# Patient Record
Sex: MALE | Race: WHITE | NOT HISPANIC OR LATINO | Employment: FULL TIME
[De-identification: names, ages, dates, MRNs, and addresses within clinical notes are randomized per-mention and may not be internally consistent; named-entity substitution may affect disease eponyms.]

---

## 2019-09-28 ENCOUNTER — HEALTH MAINTENANCE LETTER (OUTPATIENT)
Age: 39
End: 2019-09-28

## 2020-06-18 ENCOUNTER — APPOINTMENT (OUTPATIENT)
Age: 40
Setting detail: DERMATOLOGY
End: 2020-06-22

## 2020-06-18 VITALS — WEIGHT: 180 LBS | HEIGHT: 70 IN | RESPIRATION RATE: 16 BRPM

## 2020-06-18 DIAGNOSIS — B36.0 PITYRIASIS VERSICOLOR: ICD-10-CM

## 2020-06-18 DIAGNOSIS — D22 MELANOCYTIC NEVI: ICD-10-CM

## 2020-06-18 DIAGNOSIS — L81.4 OTHER MELANIN HYPERPIGMENTATION: ICD-10-CM

## 2020-06-18 PROBLEM — D22.5 MELANOCYTIC NEVI OF TRUNK: Status: ACTIVE | Noted: 2020-06-18

## 2020-06-18 PROCEDURE — 99214 OFFICE O/P EST MOD 30 MIN: CPT

## 2020-06-18 PROCEDURE — OTHER PRESCRIPTION SAMPLES PROVIDED: OTHER

## 2020-06-18 PROCEDURE — OTHER SUNSCREEN RECOMMENDATIONS: OTHER

## 2020-06-18 PROCEDURE — OTHER PRESCRIPTION: OTHER

## 2020-06-18 PROCEDURE — OTHER COUNSELING: OTHER

## 2020-06-18 RX ORDER — KETOCONAZOLE 20 MG/ML
SHAMPOO TOPICAL TIW
Qty: 1 | Refills: 10 | Status: ERX | COMMUNITY
Start: 2020-06-18

## 2020-06-18 ASSESSMENT — LOCATION DETAILED DESCRIPTION DERM
LOCATION DETAILED: RIGHT POSTERIOR SHOULDER
LOCATION DETAILED: RIGHT CENTRAL MALAR CHEEK
LOCATION DETAILED: PERIUMBILICAL SKIN
LOCATION DETAILED: LEFT SUPERIOR LATERAL UPPER BACK
LOCATION DETAILED: LEFT SUPERIOR MEDIAL MIDBACK
LOCATION DETAILED: LEFT POSTERIOR SHOULDER
LOCATION DETAILED: STERNUM
LOCATION DETAILED: RIGHT SUPERIOR MEDIAL UPPER BACK

## 2020-06-18 ASSESSMENT — LOCATION SIMPLE DESCRIPTION DERM
LOCATION SIMPLE: RIGHT CHEEK
LOCATION SIMPLE: CHEST
LOCATION SIMPLE: RIGHT SHOULDER
LOCATION SIMPLE: LEFT SHOULDER
LOCATION SIMPLE: RIGHT UPPER BACK
LOCATION SIMPLE: LEFT LOWER BACK
LOCATION SIMPLE: ABDOMEN
LOCATION SIMPLE: LEFT UPPER BACK

## 2020-06-18 ASSESSMENT — LOCATION ZONE DERM
LOCATION ZONE: TRUNK
LOCATION ZONE: FACE
LOCATION ZONE: ARM

## 2020-06-18 NOTE — HPI: FULL BODY SKIN EXAMINATION
How Severe Are Your Spot(S)?: moderate
What Type Of Note Output Would You Prefer (Optional)?: Bullet Format
What Is The Reason For Today's Visit?: Skin Lesion
What Is The Reason For Today's Visit? (Being Monitored For X): the development of new lesions

## 2021-01-10 ENCOUNTER — HEALTH MAINTENANCE LETTER (OUTPATIENT)
Age: 41
End: 2021-01-10

## 2021-10-23 ENCOUNTER — HEALTH MAINTENANCE LETTER (OUTPATIENT)
Age: 41
End: 2021-10-23

## 2022-02-12 ENCOUNTER — HEALTH MAINTENANCE LETTER (OUTPATIENT)
Age: 42
End: 2022-02-12

## 2022-10-09 ENCOUNTER — HEALTH MAINTENANCE LETTER (OUTPATIENT)
Age: 42
End: 2022-10-09

## 2023-05-02 ENCOUNTER — THERAPY VISIT (OUTPATIENT)
Dept: PHYSICAL THERAPY | Facility: CLINIC | Age: 43
End: 2023-05-02
Payer: COMMERCIAL

## 2023-05-02 DIAGNOSIS — R27.8 MUSCULAR INCOORDINATION: ICD-10-CM

## 2023-05-02 DIAGNOSIS — R10.2 PELVIC PAIN IN MALE: ICD-10-CM

## 2023-05-02 DIAGNOSIS — M62.89 HIGH-TONE PELVIC FLOOR DYSFUNCTION: ICD-10-CM

## 2023-05-02 PROCEDURE — 97535 SELF CARE MNGMENT TRAINING: CPT | Mod: GP | Performed by: PHYSICAL THERAPIST

## 2023-05-02 PROCEDURE — 97161 PT EVAL LOW COMPLEX 20 MIN: CPT | Mod: GP | Performed by: PHYSICAL THERAPIST

## 2023-05-02 PROCEDURE — 97110 THERAPEUTIC EXERCISES: CPT | Mod: GP | Performed by: PHYSICAL THERAPIST

## 2023-05-02 NOTE — PROGRESS NOTES
Physical Therapy Initial Evaluation  Subjective:  The history is provided by the patient. No  was used.   Therapist Generated HPI Evaluation  Problem details: Onset of pelvic floor pain about 8 years ago. Just woke up with the pain when he had to go to the bathroom. Went to MD who gave him antibiotics which didn't help. Then put in a camera and that was all normal. When he flared, would have to go to the bathroom many times/night. Upwards of 5-6x/night but that's not all the time. Sometimes will only go 1x. Through work was sent to [facility] for a physical and did some testing and their best conclusion was pelvic floor. Pain overall will ebb and flow. Sometimes will have to strain to start urination and then relax. .                                    SUBJECTIVE:    Urination:  Do you leak on the way to the bathroom or with a strong urge to void? No   Do you leak with cough,sneeze, jumping, running?No   Any other activities that cause leaking? After urination. Will go to the bathroom and then have some leakage.    Do you have triggers that make you feel you can't wait to go to the bathroom? No what are they.  Type of pad and number used per day? None  When you leak what is the amount? Small  Will get increasing pain with the need to go to the bathroom. Is able to control it when he has access to the bathroom. Notes the pain is more in his genitals, shaft of the penis and the L lower abdominal area. Feels like it's a strong pressure pain. Once he urinates, the pain goes away.  How long can you delay the need to urinate? Doesn't delay due to the pain.  Tends to go to the bathroom 15 min if he's drinking a lot of water. If he doesn't drink a lot, he has a hard time going to the bathroom. When he goes frequently, he gets a decent amount out.   How many times do you get up to urinate at night? 1-2 up to 5-6   Can you stop the flow of urine when on the toilet? Yes  Is the volume of urine passed usually:  medium. (8sec rule= 250ml with average bladder storing 400-600ml)  Do you feel empty when you are done? Doesn't always feel when he's done. Doesn't feel like he has good feeling in his bladder. Does feel empty.   Do you strain to pass urine? Yes, if it's a JIC pee.  Do you have a slow or hesitant urinary stream? Yes  Do you have difficulty initiating the urine stream? Yes  Is urination painful? No  How many bladder infections have you had in last 12 months?0  Fluid intake(one glass is 8oz or one cup) 60-80 oz glasses/day, 0 caffinated glasses/day  0 alcohol glasses/day.    Bowel habits:  Frequency of bowel movements? 7 times a week. Does feel like his bowels are constrained and that his stools are a little skinnier than before but no problem getting them out.  Consistancy of stool? soft formed, [place] Stool Scale 4  Do you ignore the urge to defecate? No  Do you strain to pass stool? No    Aggrevating factors:  Is loss of stool associated with an activity (lifting, coughing, running) or a food)  No  Are there any foods that increase or decrease your symptoms?  No  Do you have any food allergies?  No      Sensation:   Can you tell if there is solid, liquid, or gas in the rectum?  Yes  Do you feel the urge to move your bowels?  Yes  Is the urge very strong and difficult to control? NA Or weak/absent? NA  Do you feel the rectum is empty with you finish a bowel movement?   Yes    Do you have abdominal pain?  No  Describe the quality of the pain: NA  What makes your abdominal pain worse? NA  What makes your abdominal pain better? NA  Do you ever have pain that wakes you at night? NA    Do you have rectal pain, pressure, or burning?  No     Pelvic Pain:  Are you sexually active?Yes  Orgasms will feel pinched. Instead of semen just coming out, it feels like there's something blocking it and instead of it all coming out at once, it's coming out little by little.   Over the last year, felt like his erections weren't as  strong but has started to come back a little bit. Denies pain with orgasm or erection but if he's on top and pressing down will feel pain in his shaft. Sometimes post orgasm, will feel pain in the shaft as well which lasts only a minute or so.  Have you ever been worried for your physical safety? No  Do you have any depression, anxiety, panic attacks, excessive stress?  No but tends to be kind of type A.  Any abdominal or pelvic surgeries? No  Are you having any regular exercise? Yes, works out 4-5x/wk lifts 3-4x/week and runs 1 time during the week.   Have you practiced the PF(kegel) exercises for 4 or more weeks?NA  Thyroid checked?No(related to hair loss, flu-like symptoms, wt gain/loss, fatigue, menopause)  Changed diet lately?Couple years ago started eating healthier.    OBSERVATION  Lumbar Posture: Negative  Pelvic symmetry: Negative      ROM  Single leg standing unilateral hip flexion PSIS:Negative  Standing forward flexion PSIS:Negative  Passive Hip ROM:Positive for tightness at end ranges  JAILYN:Positive for tightness at end ranges    JAILYN with OP:Positive for tightness at end ranges      MUSCLE PERFORMANCE  Active SLR:Positive for poor load transfer.   Baseline PF tone:hyper with palpation externally zoë through urogenital triangle. Internal rectal muscle palpation revealed significant tension throughout L>R zoë through puborectalis, OI and into insertion into posterior pubic bone. Pt noted re-creation of pain and pressure with palpation of puborectalis on the L.  PF Tone with cough: hyper  Valsalva: minimal movement noted without opening.  PF Response quality: sluggish and slow return to baseline  PF Power: Center: 1 likely due to tension. Poor ROM noted with contraction as well as relaxation and bulge. Stronger on right/left symmetrical.  Endurance: Maximum contraction in seconds: NT due to tone  # of endurance contractions before fatigue: NT due to tone  Quick contraction repetitions prior to fatigue:  NT due to tone.  Specificity/accessory muscles: overuses gluts, adductors, Synergy with abdominals: Not Tested.        PALPATION: Pain: levator ani, obturator internis and coccygeous                        Objective:  System                                 Pelvic Dysfunction Evaluation:        Flexibility:    Tightness present at:Adductors; Iliopsoas; Hamstrings; Piriformis and Gluteals    Abdominal Wall:  Abdominal wall pelvic: Tension noted throughout lower abdominal area. Poor abdominal expansion with breathing. Holds seld in protective posturing.    Trigger Points:  Transverse abdominals    Pelvic Clock Exam:    Ischiocavernosis pain:  +  Bulbocavernosis pain:  +  Transverse Perineal:  +  Levator ANI:  +  Perineal Body:  +/-      External Assessment:  External assessment pelvic: Poor ability to bear down. no pelvic floor opening noted.  Skin Condition:  Normal          Muscle Contraction/Perineal Mobility:  Slight lift, no urogential triangle descent  Internal Assessment:  Internal assessment pelvic: Increased tone noted in EAS with minimal change with squeeze. With cue to bear down, paradoxical contraction noted with poor lengthening through levator ani.    Contraction/Grade:  Fllicker muscles stretched (1)                               General     ROS    Assessment/Plan:    Patient is a 42 year old male with pelvic complaints.    Patient has the following significant findings with corresponding treatment plan.                Diagnosis 1:  High tone pelvic floor dysfunction with resulting in pelvic pain  Pain -  manual therapy, self management, education, dry needling and home program  Decreased ROM/flexibility - manual therapy, therapeutic exercise, therapeutic activity and home program  Decreased strength - therapeutic exercise, therapeutic activities and home program  Impaired muscle performance - biofeedback, neuro re-education and home program  Decreased function - therapeutic activities and home  program    Therapy Evaluation Codes:   1) History comprised of:   Personal factors that impact the plan of care:      None.    Comorbidity factors that impact the plan of care are:      None.     Medications impacting care: None.  2) Examination of Body Systems comprised of:   Body structures and functions that impact the plan of care:      Pelvis.   Activity limitations that impact the plan of care are:      Working, Frequency, [place] and Urgency.  3) Clinical presentation characteristics are:   Stable/Uncomplicated.  4) Decision-Making    Low complexity using standardized patient assessment instrument and/or measureable assessment of functional outcome.  Cumulative Therapy Evaluation is: Low complexity.    Previous and current functional limitations:  (See Goal Flow Sheet for this information)    Short term and Long term goals: (See Goal Flow Sheet for this information)     Communication ability:  Patient appears to be able to clearly communicate and understand verbal and written communication and follow directions correctly.  Treatment Explanation - The following has been discussed with the patient:   RX ordered/plan of care  Anticipated outcomes  Possible risks and side effects  This patient would benefit from PT intervention to resume normal activities.   Rehab potential is good.    Frequency:  1 X week, once daily  Duration:  for 8-12 weeks  Discharge Plan:  Achieve all LTG.  Independent in home treatment program.  Reach maximal therapeutic benefit.    Please refer to the daily flowsheet for treatment today, total treatment time and time spent performing 1:1 timed codes.

## 2023-05-09 ENCOUNTER — THERAPY VISIT (OUTPATIENT)
Dept: PHYSICAL THERAPY | Facility: CLINIC | Age: 43
End: 2023-05-09
Payer: COMMERCIAL

## 2023-05-09 DIAGNOSIS — R27.8 MUSCULAR INCOORDINATION: ICD-10-CM

## 2023-05-09 DIAGNOSIS — M62.89 HIGH-TONE PELVIC FLOOR DYSFUNCTION: ICD-10-CM

## 2023-05-09 DIAGNOSIS — R10.2 PELVIC PAIN IN MALE: Primary | ICD-10-CM

## 2023-05-09 PROCEDURE — 20561 NDL INSJ W/O NJX 3+ MUSC: CPT | Performed by: PHYSICAL THERAPIST

## 2023-05-09 PROCEDURE — 97110 THERAPEUTIC EXERCISES: CPT | Mod: GP | Performed by: PHYSICAL THERAPIST

## 2023-05-16 ENCOUNTER — THERAPY VISIT (OUTPATIENT)
Dept: PHYSICAL THERAPY | Facility: CLINIC | Age: 43
End: 2023-05-16
Payer: COMMERCIAL

## 2023-05-16 DIAGNOSIS — M62.89 HIGH-TONE PELVIC FLOOR DYSFUNCTION: ICD-10-CM

## 2023-05-16 DIAGNOSIS — R27.8 MUSCULAR INCOORDINATION: ICD-10-CM

## 2023-05-16 DIAGNOSIS — R10.2 PELVIC PAIN IN MALE: Primary | ICD-10-CM

## 2023-05-16 PROCEDURE — 97110 THERAPEUTIC EXERCISES: CPT | Mod: GP | Performed by: PHYSICAL THERAPIST

## 2023-05-16 PROCEDURE — 97140 MANUAL THERAPY 1/> REGIONS: CPT | Mod: GP | Performed by: PHYSICAL THERAPIST

## 2023-05-16 PROCEDURE — 20560 NDL INSJ W/O NJX 1 OR 2 MUSC: CPT | Performed by: PHYSICAL THERAPIST

## 2023-06-01 ENCOUNTER — THERAPY VISIT (OUTPATIENT)
Dept: PHYSICAL THERAPY | Facility: CLINIC | Age: 43
End: 2023-06-01
Payer: COMMERCIAL

## 2023-06-01 DIAGNOSIS — M62.89 HIGH-TONE PELVIC FLOOR DYSFUNCTION: ICD-10-CM

## 2023-06-01 DIAGNOSIS — R27.8 MUSCULAR INCOORDINATION: ICD-10-CM

## 2023-06-01 DIAGNOSIS — R10.2 PELVIC PAIN IN MALE: Primary | ICD-10-CM

## 2023-06-01 PROCEDURE — 97140 MANUAL THERAPY 1/> REGIONS: CPT | Mod: GP | Performed by: PHYSICAL THERAPIST

## 2023-06-08 ENCOUNTER — THERAPY VISIT (OUTPATIENT)
Dept: PHYSICAL THERAPY | Facility: CLINIC | Age: 43
End: 2023-06-08
Payer: COMMERCIAL

## 2023-06-08 DIAGNOSIS — R10.2 PELVIC PAIN IN MALE: Primary | ICD-10-CM

## 2023-06-08 DIAGNOSIS — R27.8 MUSCULAR INCOORDINATION: ICD-10-CM

## 2023-06-08 DIAGNOSIS — M62.89 HIGH-TONE PELVIC FLOOR DYSFUNCTION: ICD-10-CM

## 2023-06-08 PROCEDURE — 20561 NDL INSJ W/O NJX 3+ MUSC: CPT | Performed by: PHYSICAL THERAPIST

## 2023-06-08 PROCEDURE — 97140 MANUAL THERAPY 1/> REGIONS: CPT | Mod: GP | Performed by: PHYSICAL THERAPIST

## 2023-06-22 ENCOUNTER — THERAPY VISIT (OUTPATIENT)
Dept: PHYSICAL THERAPY | Facility: CLINIC | Age: 43
End: 2023-06-22
Payer: COMMERCIAL

## 2023-06-22 DIAGNOSIS — M62.89 HIGH-TONE PELVIC FLOOR DYSFUNCTION: ICD-10-CM

## 2023-06-22 DIAGNOSIS — R27.8 MUSCULAR INCOORDINATION: ICD-10-CM

## 2023-06-22 DIAGNOSIS — R10.2 PELVIC PAIN IN MALE: Primary | ICD-10-CM

## 2023-06-22 PROCEDURE — 97530 THERAPEUTIC ACTIVITIES: CPT | Mod: GP | Performed by: PHYSICAL THERAPIST

## 2023-06-22 NOTE — PROGRESS NOTES
06/22/23 0500   Appointment Info   Signing clinician's name / credentials Radha Contreras, MPT, PRPC   Total/Authorized Visits E & T (8-12)   Visits Used 6   Medical Diagnosis Pelvic Pain   PT Tx Diagnosis Pelvic pain   GOALS   PT Goals 2   PT Goal 1   Goal Identifier Straining with voiding   Goal Description Decrease straining and hesitation with voiding to none.   Rationale to maximize safety and independence with performance of ADLs and functional tasks;to maximize safety and independence within the home;to maximize safety and independence with self cares   Goal Progress No significant change since last visit   Target Date 07/25/23   PT Goal 2   Goal Identifier Voiding at night   Goal Description Pt to reduce the number of times he experiences urgency at night to none   Rationale to maximize safety and independence with performance of ADLs and functional tasks;to maximize safety and independence with self cares;to maximize safety and independence within the home   Goal Progress No significant change since last visit   Target Date 07/25/23   Subjective Report   Subjective Report Pt reports he continues to not notice any significant change. On the way here today, felt like he had to go to the bathroom and felt like it's in the shaft, feels a little like burning. Over the years he's had some ebs and flows and maybe is a little better than in the past but nothing significant. didn't get any relief following last visit either. Does feel he hasn't gone to the bathroom quite as much in the middle of the night.   Objective Measures   Objective Measures Objective Measure 1;Objective Measure 2   Objective Measure 1   Objective Measure Pelvic floor muscle tension/dysfunction   Details No significant change. Patient continues to have significant tension throughout pelvic floor and lower abdominal region   Objective Measure 2   Objective Measure Skin integrity   Details No change in skin integrity.   Treatment  Interventions (PT)   Interventions Therapeutic Activity   Therapeutic Activity   Therapeutic Activities: dynamic activities to improve functional performance minutes [id number]) 15   Ther Act 1 Discussed overall lack of progression with PT at this time. Pt doesn't feel that the things we have tried have really helped much at all, even temporarily. No significant relief with dry needling, manual release throughout pelvic floor, stretching or exercises. Talked about length of time that pt has had the pain and could be that it's taking longer for sx's to subside. Pt does note he has a high deductible insurance plan so is having to pay for visits out of pocket so would like to see how it goes on his own until he follows up with [facility]. Did discuss that if he fills out the bladder diary, to Mychart to me and if there's anything significant there to change or work on, will replay on there.   Ther Act 1 - Details Answered patient's questions.   Plan   Home program recommend patient continue with stretching and exercises at home   Updates to plan of care D/C to [facility] due to overall lack of progress and recommend FU with MD.   Total Session Time   Timed Code Treatment Minutes 15   Total Treatment Time (sum of timed and untimed services) 15         PLAN  Discontinue due to overall lack of progress and recommend Follow-up with MD.    Beginning/End Dates of Progress Note Reporting Period:    5/2/23 to 06/22/2023    Referring Provider:  No ref. provider found

## 2023-10-28 ENCOUNTER — HEALTH MAINTENANCE LETTER (OUTPATIENT)
Age: 43
End: 2023-10-28

## 2024-12-21 ENCOUNTER — HEALTH MAINTENANCE LETTER (OUTPATIENT)
Age: 44
End: 2024-12-21